# Patient Record
Sex: MALE
[De-identification: names, ages, dates, MRNs, and addresses within clinical notes are randomized per-mention and may not be internally consistent; named-entity substitution may affect disease eponyms.]

---

## 2019-09-17 NOTE — HP
HISTORY OF PRESENT ILLNESS:  Mr. Good presents for a protracted course of low

back and right lower extremity.  States he has had pain for 10-12 years and has been

 __________ several epidural injections, physical therapy  __________ past several

weeks reveals a lateral recess stenosis at L5 on the right side as well as foraminal

stenosis that likely accounts for the pain brings him in for surgical treatment

today. 



PAST MEDICAL HISTORY:  Significant for anxiety, chronic pain syndrome,

gastroesophageal reflux disease, seasonal allergies, hypercholesterolemia,

headaches. 



PAST SURGICAL HISTORY:  None.



CURRENT MEDICATIONS:  

1. Tramadol.

2. Hydroxyzine. 

3. Venlafaxine.

4. Omeprazole.

5. Loratadine.

6. Aspirin.

7. Fluticasone.



PHYSICAL EXAMINATION:

The patient is alert and oriented x3. Gait is mildly antalgic.  Lower extremity

__________ positive right side straight leg rise. 



ASSESSMENT:  Lumbar radiculopathy.



PLAN:  Dr. Palacio met with the patient, reviewed imaging for right L5 decompression.

He explained to the patient the risks, benefits and alternatives of the procedure.

The patient expressed understanding and elected to move forward with surgery as

discussed.  I do believe the patient is mentally competent and capable of making

medical decisions for himself, we will move forward with surgery as planned. 







Job ID:  [id number]

## 2019-09-18 ENCOUNTER — HOSPITAL ENCOUNTER (OUTPATIENT)
Dept: HOSPITAL 92 - SDC | Age: 59
Discharge: HOME | End: 2019-09-18
Attending: NEUROLOGICAL SURGERY
Payer: OTHER GOVERNMENT

## 2019-09-18 VITALS — BODY MASS INDEX: 27.4 KG/M2

## 2019-09-18 DIAGNOSIS — K21.9: ICD-10-CM

## 2019-09-18 DIAGNOSIS — Z79.899: ICD-10-CM

## 2019-09-18 DIAGNOSIS — Z87.891: ICD-10-CM

## 2019-09-18 DIAGNOSIS — M54.16: ICD-10-CM

## 2019-09-18 DIAGNOSIS — E78.5: ICD-10-CM

## 2019-09-18 DIAGNOSIS — G43.909: ICD-10-CM

## 2019-09-18 DIAGNOSIS — G89.4: ICD-10-CM

## 2019-09-18 DIAGNOSIS — F43.10: ICD-10-CM

## 2019-09-18 DIAGNOSIS — M19.90: ICD-10-CM

## 2019-09-18 DIAGNOSIS — M48.061: Primary | ICD-10-CM

## 2019-09-18 DIAGNOSIS — F41.9: ICD-10-CM

## 2019-09-18 DIAGNOSIS — Z79.82: ICD-10-CM

## 2019-09-18 DIAGNOSIS — F32.9: ICD-10-CM

## 2019-09-18 PROCEDURE — 76000 FLUOROSCOPY <1 HR PHYS/QHP: CPT

## 2019-09-18 PROCEDURE — 01NB0ZZ RELEASE LUMBAR NERVE, OPEN APPROACH: ICD-10-PCS | Performed by: NEUROLOGICAL SURGERY

## 2019-09-18 NOTE — OP
DATE OF PROCEDURE:  09/18/2019



FIRST ASSISTANT:  Dylon Sam PA-C



INDICATION:  Pain.



DIAGNOSIS:  Lumbar radiculopathy.



PROCEDURES PERFORMED:  Right L5 hemilaminectomy, medial facetectomy, and

foraminotomy. 



ANESTHESIA:  General.



DESCRIPTION OF PROCEDURE:  The patient was brought into the operating room and

placed under general anesthesia.  He was flipped from the supine to prone position

on the operating room table.  A linear incision was planned over the L5 segment.

After prepping and draping and after an appropriate operative pause, the incision

was created.  The soft tissues were swept right of midline.  A self-retaining

retractor was placed in the wound for optimal exposure.  After confirming the

appropriate level with C-arm fluoroscopy, high-speed cutting drill bit as well as 2,

3, and 4 mm Kerrison's were used to perform a hemilaminectomy along the inferior

aspect of L5 to include the superior aspect of S1.  The laminectomy was extended

laterally to encompass the medial aspect of the facet joint in order adequately

decompress the exiting L5 nerve root.  After decompressing the segment, the wound

was irrigated.  Hemostasis was maintained throughout.  The wound was then closed in

anatomic layers and a pressure dressing was applied.  There were no known procedural

complications. 







Job ID:  [id number]